# Patient Record
Sex: FEMALE | Race: WHITE | NOT HISPANIC OR LATINO | Employment: FULL TIME | URBAN - METROPOLITAN AREA
[De-identification: names, ages, dates, MRNs, and addresses within clinical notes are randomized per-mention and may not be internally consistent; named-entity substitution may affect disease eponyms.]

---

## 2017-06-14 ENCOUNTER — OFFICE VISIT (OUTPATIENT)
Dept: FAMILY MEDICINE | Facility: CLINIC | Age: 32
End: 2017-06-14
Payer: COMMERCIAL

## 2017-06-14 VITALS
BODY MASS INDEX: 41.15 KG/M2 | TEMPERATURE: 99 F | RESPIRATION RATE: 16 BRPM | DIASTOLIC BLOOD PRESSURE: 60 MMHG | HEIGHT: 64 IN | SYSTOLIC BLOOD PRESSURE: 120 MMHG | WEIGHT: 241 LBS | HEART RATE: 72 BPM

## 2017-06-14 DIAGNOSIS — Z30.41 ORAL CONTRACEPTIVE PILL SURVEILLANCE: ICD-10-CM

## 2017-06-14 DIAGNOSIS — Z00.00 ENCOUNTER FOR GENERAL ADULT MEDICAL EXAMINATION WITHOUT ABNORMAL FINDINGS: Primary | ICD-10-CM

## 2017-06-14 PROCEDURE — 99395 PREV VISIT EST AGE 18-39: CPT | Performed by: PHYSICIAN ASSISTANT

## 2017-06-14 RX ORDER — LEVONORGESTREL/ETHIN.ESTRADIOL 0.1-0.02MG
1 TABLET ORAL DAILY
Qty: 84 TABLET | Refills: 4 | Status: SHIPPED | OUTPATIENT
Start: 2017-06-14 | End: 2018-06-19

## 2017-06-14 ASSESSMENT — PAIN SCALES - GENERAL: PAINLEVEL: NO PAIN (0)

## 2017-06-14 NOTE — MR AVS SNAPSHOT
After Visit Summary   6/14/2017    Eufemia Dow    MRN: [id number]           Patient Information     Date Of Birth          1985        Visit Information        Provider Department      6/14/2017 4:15 PM Iliana Castano PA-C [facility]        Today's Diagnoses     Encounter for general adult medical examination without abnormal findings    -  1    Oral contraceptive pill surveillance          Care Instructions      Preventive Health Recommendations  Female Ages 26 - 39  Yearly exam:   See your health care provider every year in order to    Review health changes.     Discuss preventive care.      Review your medicines if you your doctor has prescribed any.    Until age 30: Get a Pap test every three years (more often if you have had an abnormal result).    After age 30: Talk to your doctor about whether you should have a Pap test every 3 years or have a Pap test with HPV screening every 5 years.   You do not need a Pap test if your uterus was removed (hysterectomy) and you have not had cancer.  You should be tested each year for STDs (sexually transmitted diseases), if you're at risk.   Talk to your provider about how often to have your cholesterol checked.  If you are at risk for diabetes, you should have a diabetes test (fasting glucose).  Shots: Get a flu shot each year. Get a tetanus shot every 10 years.   Nutrition:     Eat at least 5 servings of fruits and vegetables each day.    Eat whole-grain bread, whole-wheat pasta and brown rice instead of white grains and rice.    Talk to your provider about Calcium and Vitamin D.     Lifestyle    Exercise at least 150 minutes a week (30 minutes a day, 5 days of the week). This will help you control your weight and prevent disease.    Limit alcohol to one drink per day.    No smoking.     Wear sunscreen to prevent skin cancer.    See your dentist every six months for an exam and cleaning.            Follow-ups after your visit    "     Who to contact     If you have questions or need follow up information about today's clinic visit or your schedule please contact [facility] directly at [phone].  Normal or non-critical lab and imaging results will be communicated to you by MyChart, letter or phone within 4 business days after the clinic has received the results. If you do not hear from us within 7 days, please contact the clinic through MyChart or phone. If you have a critical or abnormal lab result, we will notify you by phone as soon as possible.  Submit refill requests through [employer] or call your pharmacy and they will forward the refill request to us. Please allow 3 business days for your refill to be completed.          Additional Information About Your Visit        [employer] Information     [employer] lets you send messages to your doctor, view your test results, renew your prescriptions, schedule appointments and more. To sign up, go to [email or web address] . Click on \"Log in\" on the left side of the screen, which will take you to the Welcome page. Then click on \"Sign up Now\" on the right side of the page.     You will be asked to enter the access code listed below, as well as some personal information. Please follow the directions to create your username and password.     Your access code is: [email or web address]  Expires: 2017  4:45 PM     Your access code will  in 90 days. If you need help or a new code, please call your [place] clinic or [phone].        Care EveryWhere ID     This is your Care EveryWhere ID. This could be used by other organizations to access your [place] medical records  [id number]        Your Vitals Were     Pulse Temperature Respirations Height Last Period BMI (Body Mass Index)    72 99  F (37.2  C) (Tympanic) 16 5' 4.3\" (1.633 m) 2017 (Approximate) 40.98 kg/m2       Blood Pressure from Last 3 Encounters:   17 120/60   05/29/15 120/70   14 106/58    Weight from " Last 3 Encounters:   06/14/17 241 lb (109.3 kg)   05/29/15 202 lb (91.6 kg)   05/06/14 203 lb (92.1 kg)              Today, you had the following     No orders found for display         Today's Medication Changes          These changes are accurate as of: 6/14/17  4:45 PM.  If you have any questions, ask your nurse or doctor.               These medicines have changed or have updated prescriptions.        Dose/Directions    levonorgestrel-ethinyl estradiol 0.1-20 MG-MCG per tablet   Commonly known as:  FALMINA   This may have changed:  See the new instructions.   Used for:  Oral contraceptive pill surveillance   Changed by:  Iliana Castano PA-C        Dose:  1 tablet   Take 1 tablet by mouth daily   Quantity:  84 tablet   Refills:  4            Where to get your medicines      These medications were sent to [facility] [place] [facility] [address]   [address] [facility] [address]     Phone:  [phone]     levonorgestrel-ethinyl estradiol 0.1-20 MG-MCG per tablet                Primary Care Provider Office Phone # Fax #    Iliana Castano PA-C [phone]       [address]   [place] [address]        Thank you!     Thank you for choosing [facility]  for your care. Our goal is always to provide you with excellent care. Hearing back from our patients is one way we can continue to improve our services. Please take a few minutes to complete the written survey that you may receive in the mail after your visit with us. Thank you!             Your Updated Medication List - Protect others around you: Learn how to safely use, store and throw away your medicines at www.disposemymeds.org.          This list is accurate as of: 6/14/17  4:45 PM.  Always use your most recent med list.                   Brand Name Dispense Instructions for use    levonorgestrel-ethinyl estradiol 0.1-20 MG-MCG per tablet    FALMINA    84 tablet    Take  1 tablet by mouth daily

## 2017-06-14 NOTE — NURSING NOTE
"Chief Complaint   Patient presents with     Physical       Initial /60  Pulse 72  Temp 99  F (37.2  C) (Tympanic)  Resp 16  Ht 5' 4.3\" (1.633 m)  Wt 241 lb (109.3 kg)  LMP 05/31/2017 (Approximate)  BMI 40.98 kg/m2 Estimated body mass index is 40.98 kg/(m^2) as calculated from the following:    Height as of this encounter: 5' 4.3\" (1.633 m).    Weight as of this encounter: 241 lb (109.3 kg).  Medication Reconciliation: complete   Emily Cannon CMA (AAMA)   "

## 2017-06-14 NOTE — PROGRESS NOTES
SUBJECTIVE:     CC: Eufemia Dow is an 31 year old woman who presents for preventive health visit.     Healthy Habits:    Do you get at least three servings of calcium containing foods daily (dairy, green leafy vegetables, etc.)? yes    Amount of exercise or daily activities, outside of work: 2 day(s) per week    Problems taking medications regularly No    Medication side effects: No    Have you had an eye exam in the past two years? yes    Do you see a dentist twice per year? yes    Do you have sleep apnea, excessive snoring or daytime drowsiness?no            Today's PHQ-2 Score:   PHQ-2 ( 1999 [employer]) 6/14/2017 5/29/2015   Q1: Little interest or pleasure in doing things 0 0   Q2: Feeling down, depressed or hopeless 0 0   PHQ-2 Score 0 0       Abuse: Current or Past(Physical, Sexual or Emotional)- No  Do you feel safe in your environment - Yes    Social History   Substance Use Topics     Smoking status: Never Smoker     Smokeless tobacco: Never Used     Alcohol use Yes      Comment: occ     The patient does not drink >3 drinks per day nor >7 drinks per week.    Recent Labs   Lab Test  05/29/15   1131   CHOL  166   HDL  54   LDL  94   TRIG  90   CHOLHDLRATIO  3.1       Reviewed orders with patient.  Reviewed health maintenance and updated orders accordingly - Yes    Mammo Decision Support:  Mammogram not appropriate for this patient based on age.    Pertinent mammograms are reviewed under the imaging tab.  History of abnormal Pap smear:   Last 3 Pap Results:   PAP (no units)   Date Value   05/29/2015 NIL   03/24/2014 NIL   03/04/2013 NIL       Reviewed and updated as needed this visit by clinical staff  Tobacco  Allergies  Meds         Reviewed and updated as needed this visit by Provider        Past Medical History:   Diagnosis Date     ASCUS favoring benign 1/25/11    + LR HPV     H/O colposcopy with cervical biopsy 2/12/07    WNL with + HR HPV     HSIL (high grade squamous intraepithelial lesion) on Pap  "smear 4/9/09     LSIL (low grade squamous intraepithelial lesion) on Pap smear 1/26/07     Papanicolaou smear of cervix with low grade squamous intraepithelial lesion (LGSIL) 1/30/2007    Positive for HPV 66 - high risk. Colposcopy done.     Tendonitis of wrist, right 11/5/2013     Past Surgical History:   Procedure Laterality Date     HC COLP VAGINA W CERVIX IF PRES W BIOPSY  2/2007     Social History   Substance Use Topics     Smoking status: Never Smoker     Smokeless tobacco: Never Used     Alcohol use Yes      Comment: occ     Family History   Problem Relation Age of Onset     DIABETES Paternal Grandfather      CEREBROVASCULAR DISEASE Maternal Grandmother      CANCER Brother      bone cancer        Allergies   Allergen Reactions     No Known Allergies      Current Outpatient Prescriptions   Medication Sig Dispense Refill     levonorgestrel-ethinyl estradiol (FALMINA) 0.1-20 MG-MCG per tablet Take 1 tablet by mouth daily 84 tablet 4           ROS:  C: NEGATIVE for fever, chills, change in weight  I: NEGATIVE for worrisome rashes, moles or lesions  E: NEGATIVE for vision changes or irritation  ENT: NEGATIVE for ear, mouth and throat problems  R: NEGATIVE for significant cough or SOB  B: NEGATIVE for masses, tenderness or discharge  CV: NEGATIVE for chest pain, palpitations or peripheral edema  GI: NEGATIVE for nausea, abdominal pain, heartburn, or change in bowel habits  : NEGATIVE for unusual urinary or vaginal symptoms. Periods are regular.  M: NEGATIVE for significant arthralgias or myalgia  N: NEGATIVE for weakness, dizziness or paresthesias  E: NEGATIVE for temperature intolerance, skin/hair changes  H: NEGATIVE for bleeding problems  P: NEGATIVE for changes in mood or affect    Problem list, Medication list, Allergies, and Medical/Social/Surgical histories reviewed in [facility] and updated as appropriate.  OBJECTIVE:     /60  Pulse 72  Temp 99  F (37.2  C) (Tympanic)  Resp 16  Ht 5' 4.3\" (1.633 m)  " "Wt 241 lb (109.3 kg)  LMP 05/31/2017 (Approximate)  BMI 40.98 kg/m2  EXAM:  GENERAL: healthy, alert and no distress  EYES: Eyes grossly normal to inspection, PERRL and conjunctivae and sclerae normal  HENT: ear canals and TM's normal, nose and mouth without ulcers or lesions  NECK: no adenopathy, no asymmetry, masses, or scars and thyroid normal to palpation  RESP: lungs clear to auscultation - no rales, rhonchi or wheezes  BREAST: normal without masses, tenderness or nipple discharge and no palpable axillary masses or adenopathy  CV: regular rate and rhythm, normal S1 S2, no S3 or S4, no murmur, click or rub, no peripheral edema and peripheral pulses strong  ABDOMEN: soft, nontender, no hepatosplenomegaly, no masses and bowel sounds normal   (female): deferred  MS: no gross musculoskeletal defects noted, no edema  SKIN: no suspicious lesions or rashes  NEURO: Normal strength and tone, mentation intact and speech normal  PSYCH: mentation appears normal, affect normal/bright    ASSESSMENT/PLAN:         ICD-10-CM    1. Encounter for general adult medical examination without abnormal findings Z00.00    2. Oral contraceptive pill surveillance Z30.41 levonorgestrel-ethinyl estradiol (FALMINA) 0.1-20 MG-MCG per tablet       COUNSELING:   Reviewed preventive health counseling, as reflected in patient instructions       Regular exercise       Healthy diet/nutrition       Vision screening       Contraception       Osteoporosis Prevention/Bone Health         reports that she has never smoked. She has never used smokeless tobacco.    Estimated body mass index is 40.98 kg/(m^2) as calculated from the following:    Height as of this encounter: 5' 4.3\" (1.633 m).    Weight as of this encounter: 241 lb (109.3 kg).   Weight management plan: Discussed healthy diet and exercise guidelines and patient will follow up in 12 months in clinic to re-evaluate.     Refilled OCP x 1 year./    Counseling Resources:  ATP IV " Guidelines  Pooled Cohorts Equation Calculator  Breast Cancer Risk Calculator  FRAX Risk Assessment  ICSI Preventive Guidelines  Dietary Guidelines for Americans, 2010  USDA's MyPlate  ASA Prophylaxis  Lung CA Screening    Iliana Castano PA-C  [facility]    Orders Placed This Encounter     levonorgestrel-ethinyl estradiol (FALMINA) 0.1-20 MG-MCG per tablet       Chart documentation done in part with Dragon Voice recognition Software. Although reviewed after completion, some word and grammatical error may remain.  AVS given to patient upon discharge today.  Electronically signed by Iliana Castano PA-C  [place] 15, 2017  11:09 AM

## 2018-06-19 DIAGNOSIS — Z30.41 ORAL CONTRACEPTIVE PILL SURVEILLANCE: ICD-10-CM

## 2018-06-19 NOTE — TELEPHONE ENCOUNTER
"Requested Prescriptions   Pending Prescriptions Disp Refills     FALMINA 0.1-20 MG-MCG per tablet [Pharmacy Med Name: FALMINA 0.1-20MG-MCG TABS] 84 tablet 3     Sig: TAKE ONE TABLET BY MOUTH EVERY DAY    Contraceptives Protocol Failed    6/19/2018  6:09 AM       Failed - Recent (12 mo) or future (30 days) visit within the authorizing provider's specialty    Patient had office visit in the last 12 months or has a visit in the next 30 days with authorizing provider or within the authorizing provider's specialty.  See \"Patient Info\" tab in inbasket, or \"Choose Columns\" in Meds & Orders section of the refill encounter.           Passed - Patient is not a current smoker if age is 35 or older       Passed - No active pregnancy on record       Passed - No positive pregnancy test in past 12 months        Last Written Prescription Date:  6/14/2017  Last Fill Quantity: 84,  # refills: 4   Last office visit: 6/14/2017 with prescribing provider:  6/14/2017   Future Office Visit:      "

## 2018-06-20 RX ORDER — LEVONORGESTREL AND ETHINYL ESTRADIOL 0.1-0.02MG
KIT ORAL
Qty: 84 TABLET | Refills: 0 | Status: SHIPPED | OUTPATIENT
Start: 2018-06-20 | End: 2018-09-24

## 2018-06-20 NOTE — TELEPHONE ENCOUNTER
OCP Prescription approved per [facility] [place] [place] Refill Protocol for one 3 month refill. Pt needs to schedule an appt.  Notified patient per comment section of RX.  ROEL Jaquez

## 2018-08-03 ENCOUNTER — HEALTH MAINTENANCE LETTER (OUTPATIENT)
Age: 33
End: 2018-08-03

## 2018-09-24 ENCOUNTER — OFFICE VISIT (OUTPATIENT)
Dept: OBGYN | Facility: CLINIC | Age: 33
End: 2018-09-24
Payer: COMMERCIAL

## 2018-09-24 VITALS
OXYGEN SATURATION: 98 % | BODY MASS INDEX: 38.29 KG/M2 | TEMPERATURE: 98.3 F | WEIGHT: 224.3 LBS | HEIGHT: 64 IN | SYSTOLIC BLOOD PRESSURE: 130 MMHG | DIASTOLIC BLOOD PRESSURE: 75 MMHG | HEART RATE: 64 BPM

## 2018-09-24 DIAGNOSIS — E66.812 CLASS 2 OBESITY DUE TO EXCESS CALORIES WITHOUT SERIOUS COMORBIDITY WITH BODY MASS INDEX (BMI) OF 38.0 TO 38.9 IN ADULT: ICD-10-CM

## 2018-09-24 DIAGNOSIS — Z01.419 ENCOUNTER FOR GYNECOLOGICAL EXAMINATION WITHOUT ABNORMAL FINDING: Primary | ICD-10-CM

## 2018-09-24 DIAGNOSIS — Z30.41 ORAL CONTRACEPTIVE PILL SURVEILLANCE: ICD-10-CM

## 2018-09-24 DIAGNOSIS — E66.09 CLASS 2 OBESITY DUE TO EXCESS CALORIES WITHOUT SERIOUS COMORBIDITY WITH BODY MASS INDEX (BMI) OF 38.0 TO 38.9 IN ADULT: ICD-10-CM

## 2018-09-24 PROCEDURE — 99395 PREV VISIT EST AGE 18-39: CPT | Performed by: OBSTETRICS & GYNECOLOGY

## 2018-09-24 PROCEDURE — G0145 SCR C/V CYTO,THINLAYER,RESCR: HCPCS | Performed by: OBSTETRICS & GYNECOLOGY

## 2018-09-24 PROCEDURE — 87624 HPV HI-RISK TYP POOLED RSLT: CPT | Performed by: OBSTETRICS & GYNECOLOGY

## 2018-09-24 RX ORDER — LEVONORGESTREL/ETHIN.ESTRADIOL 0.1-0.02MG
1 TABLET ORAL DAILY
Qty: 84 TABLET | Refills: 3 | Status: SHIPPED | OUTPATIENT
Start: 2018-09-24 | End: 2019-08-27

## 2018-09-24 NOTE — MR AVS SNAPSHOT
After Visit Summary   9/24/2018    Eufemia Dow    MRN: [id number]           Patient Information     Date Of Birth          1985        Visit Information        Provider Department      9/24/2018 4:00 PM Augustina Bowers MD [facility]        Today's Diagnoses     Encounter for gynecological examination without abnormal finding    -  1    Oral contraceptive pill surveillance        Class 2 obesity due to excess calories without serious comorbidity with body mass index (BMI) of 38.0 to 38.9 in adult           Follow-ups after your visit        Follow-up notes from your care team     Return in about 1 year (around 9/24/2019).      Who to contact     If you have questions or need follow up information about today's clinic visit or your schedule please contact [facility] directly at [phone].  Normal or non-critical lab and imaging results will be communicated to you by MyChart, letter or phone within 4 business days after the clinic has received the results. If you do not hear from us within 7 days, please contact the clinic through MyChart or phone. If you have a critical or abnormal lab result, we will notify you by phone as soon as possible.  Submit refill requests through [employer] or call your pharmacy and they will forward the refill request to us. Please allow 3 business days for your refill to be completed.          Additional Information About Your Visit        MyChart Information     [employer] gives you secure access to your electronic health record. If you see a primary care provider, you can also send messages to your care team and make appointments. If you have questions, please call your primary care clinic.  If you do not have a primary care provider, please call [phone] and they will assist you.        Care EveryWhere ID     This is your Care EveryWhere ID. This could be used by other organizations to access your [facility]  "records  [id number]        Your Vitals Were     Pulse Temperature Height Last Period Pulse Oximetry BMI (Body Mass Index)    64 98.3  F (36.8  C) (Oral) 5' 4.41\" (1.636 m) 08/20/2018 98% 38.01 kg/m2       Blood Pressure from Last 3 Encounters:   09/24/18 130/75   06/14/17 120/60   05/29/15 120/70    Weight from Last 3 Encounters:   09/24/18 224 lb 4.8 oz (101.7 kg)   06/14/17 241 lb (109.3 kg)   05/29/15 202 lb (91.6 kg)              We Performed the Following     Pap imaged thin layer screen with HPV - recommended age 30 - 65 years (select HPV order below)          Today's Medication Changes          These changes are accurate as of 9/24/18 11:59 PM.  If you have any questions, ask your nurse or doctor.               These medicines have changed or have updated prescriptions.        Dose/Directions    levonorgestrel-ethinyl estradiol 0.1-20 MG-MCG per tablet   Commonly known as:  FALMINA   This may have changed:  See the new instructions.   Used for:  Oral contraceptive pill surveillance   Changed by:  Augustina Bowers MD        Dose:  1 tablet   Take 1 tablet by mouth daily   Quantity:  84 tablet   Refills:  3            Where to get your medicines      These medications were sent to [facility], [address]   [address] [place] [address] [address] [facility] [address]     Phone:  [phone]     levonorgestrel-ethinyl estradiol 0.1-20 MG-MCG per tablet                Primary Care Provider Office Phone # Fax #    Iliana Castano PA-C [phone]       [address]   [place] [address]        Equal Access to Services     [facility] CenterJOSUÉ AH: Hadii pro Singh, wabrandida luqadaha, qaybta kaalmada adeegyada, brandon ellis. So [facility] [phone].    ATENCIÓN: Si habla español, tiene a valdes disposición servicios gratuitos de asistencia lingüística. Llame al [phone].    We comply with applicable federal civil rights laws and [place] laws. We do " not discriminate on the basis of race, color, national origin, age, disability, sex, sexual orientation, or gender identity.            Thank you!     Thank you for choosing [facility]  for your care. Our goal is always to provide you with excellent care. Hearing back from our patients is one way we can continue to improve our services. Please take a few minutes to complete the written survey that you may receive in the mail after your visit with us. Thank you!             Your Updated Medication List - Protect others around you: Learn how to safely use, store and throw away your medicines at www.disposemymeds.org.          This list is accurate as of 9/24/18 11:59 PM.  Always use your most recent med list.                   Brand Name Dispense Instructions for use Diagnosis    levonorgestrel-ethinyl estradiol 0.1-20 MG-MCG per tablet    FALMINA    84 tablet    Take 1 tablet by mouth daily    Oral contraceptive pill surveillance

## 2018-09-24 NOTE — PROGRESS NOTES
Gyn annual Eufemia is a 32 year old  female who presents for annual exam.     Besides routine health maintenance,  she would like to discuss getting refills on her ocp's.    HPI:  The patient's PCP is  Iliana Castano PA-C.  Patient has no complaints or concerns.    GYNECOLOGIC HISTORY:    Patient's last menstrual period was 2018.  Her current contraception method is: oral contraceptives.  She  reports that she has never smoked. She has never used smokeless tobacco.    Patient is sexually active.  STD testing offered?  Declined    HEALTH MAINTENANCE:  Cholesterol:   Cholesterol   Date Value Ref Range Status   2015 166 <200 mg/dL Final     Comment:     LDL Cholesterol is the primary guide to therapy.   The NCEP recommends further evaluation of: patients with cholesterol greater   than 200 mg/dL if additional risk factors are present, cholesterol greater   than   240 mg/dL, triglycerides greater than 150 mg/dL, or HDL less than 40 mg/dL.        Last Mammo: N/A   Mammo: N/A  Pap:   Lab Results   Component Value Date    PAP NIL 2015    PAP NIL 2014    PAP NIL 2013      Colonoscopy:  N/A  Dexa:  N/A    HISTORY:  Obstetric History       T0      L0     SAB0   TAB0   Ectopic0   Multiple0   Live Births0           Patient Active Problem List   Diagnosis     Papanicolaou smear of cervix with low grade squamous intraepithelial lesion (LGSIL)     HSIL (high grade squamous intraepithelial lesion) on Pap smear     CARDIOVASCULAR SCREENING; LDL GOAL LESS THAN 160     Obesity     Oral contraceptive pill surveillance     Past Surgical History:   Procedure Laterality Date     HC COLP VAGINA W CERVIX IF PRES W BIOPSY  2007      Social History   Substance Use Topics     Smoking status: Never Smoker     Smokeless tobacco: Never Used     Alcohol use Yes      Comment: occ      Problem (# of Occurrences) Relation (Name,Age of Onset)    Cancer (1) Brother: bone cancer    Cerebrovascular  "Disease (1) Maternal Grandmother (Farheen)    Diabetes (1) Paternal Grandfather (Jamari)            Current Outpatient Prescriptions   Medication Sig     levonorgestrel-ethinyl estradiol (FALMINA) 0.1-20 MG-MCG per tablet Take 1 tablet by mouth daily     No current facility-administered medications for this visit.      Allergies   Allergen Reactions     No Known Allergies        Past medical, surgical, social and family histories were reviewed and updated in EPIC.    ROS:   12 point review of systems negative other than symptoms noted below.    EXAM:  /75  Pulse 64  Temp 98.3  F (36.8  C) (Oral)  Ht 5' 4.41\" (1.636 m)  Wt 224 lb 4.8 oz (101.7 kg)  LMP 08/20/2018  SpO2 98%  BMI 38.01 kg/m2   BMI: Body mass index is 38.01 kg/(m^2).    PHYSICAL EXAM:  Constitutional:  Appearance: Well nourished, well developed, alert, in no acute distress  Neck:  Lymph Nodes:  No lymphadenopathy present    Thyroid:  Gland size normal, nontender, no nodules or masses present  on palpation  Chest:  Respiratory Effort:  Breathing unlabored  Cardiovascular:    Heart: Auscultation:  Regular rate, normal rhythm, no murmurs present  Breasts: Palpation of Breasts and Axillae:  No masses present on palpation, no breast tenderness., Axillary Lymph Nodes:  No lymphadenopathy present. and No nodularity, asymmetry or nipple discharge bilaterally.  Gastrointestinal:   Abdominal Examination:  Abdomen nontender to palpation, tone normal without rigidity or guarding, no masses present, umbilicus without lesions   Liver and Spleen:  No hepatomegaly present, liver nontender to palpation    Hernias:  No hernias present  Lymphatic: Lymph Nodes:  No other lymphadenopathy present  Skin:  General Inspection:  No rashes present, no lesions present, no areas of  discoloration    Genitalia and Groin:  No rashes present, no lesions present, no areas of  discoloration, no masses present  Neurologic/Psychiatric:    Mental Status:  Oriented X3     Pelvic " Exam:  External Genitalia:     Normal appearance for age, no discharge present, no tenderness present, no inflammatory lesions present, color normal  Vagina:     Normal vaginal vault without central or paravaginal defects, no discharge present, no inflammatory lesions present, no masses present  Bladder:     Nontender to palpation  Urethra:   Urethral Body:  Urethra palpation normal, urethra structural support normal   Urethral Meatus:  No erythema or lesions present  Cervix:     Appearance healthy, no lesions present, nontender to palpation, no bleeding present  Uterus:     Uterus: firm, normal sized and nontender, anteverted in position.   Adnexa:     No adnexal tenderness present, no adnexal masses present  Perineum:     Perineum within normal limits, no evidence of trauma, no rashes or skin lesions present  Anus:     Anus within normal limits, no hemorrhoids present  Inguinal Lymph Nodes:     No lymphadenopathy present  Pubic Hair:     Normal pubic hair distribution for age  Genitalia and Groin:     No rashes present, no lesions present, no areas of discoloration, no masses present      COUNSELING:   Reviewed preventive health counseling, as reflected in patient instructions       Regular exercise       Healthy diet/nutrition       Contraception       Safe sex practices/STD prevention    BMI: Body mass index is 38.01 kg/(m^2).      ASSESSMENT:  32 year old female with satisfactory annual exam.    ICD-10-CM    1. Encounter for gynecological examination without abnormal finding Z01.419 Pap imaged thin layer screen with HPV - recommended age 30 - 65 years (select HPV order below)   2. Oral contraceptive pill surveillance Z30.41 levonorgestrel-ethinyl estradiol (FALMINA) 0.1-20 MG-MCG per tablet   3. Class 2 obesity due to excess calories without serious comorbidity with body mass index (BMI) of 38.0 to 38.9 in adult E66.09     Z68.38        PLAN:    Pap today. Refilled ocp's x 1 year. RTC in 1 year and  isreal.    Augustina Bowers MD

## 2018-09-25 ASSESSMENT — PATIENT HEALTH QUESTIONNAIRE - PHQ9: SUM OF ALL RESPONSES TO PHQ QUESTIONS 1-9: 0

## 2018-09-27 LAB
COPATH REPORT: NORMAL
PAP: NORMAL

## 2018-09-28 LAB
FINAL DIAGNOSIS: NORMAL
HPV HR 12 DNA CVX QL NAA+PROBE: NEGATIVE
HPV16 DNA SPEC QL NAA+PROBE: NEGATIVE
HPV18 DNA SPEC QL NAA+PROBE: NEGATIVE
SPECIMEN DESCRIPTION: NORMAL
SPECIMEN SOURCE CVX/VAG CYTO: NORMAL

## 2019-08-15 DIAGNOSIS — Z30.41 ORAL CONTRACEPTIVE PILL SURVEILLANCE: ICD-10-CM

## 2019-08-15 RX ORDER — LEVONORGESTREL/ETHIN.ESTRADIOL 0.1-0.02MG
1 TABLET ORAL DAILY
Qty: 84 TABLET | Refills: 3 | Status: CANCELLED | OUTPATIENT
Start: 2019-08-15

## 2019-08-15 NOTE — TELEPHONE ENCOUNTER
FALMINA OCP  Last Written Prescription Date:  9/24/19  Last Fill Quantity: 84,  # refills: 3   Last office visit: 6/14/2017 with prescribing provider- Iliana Pruitt, She no longer works here.  She saw a Dr. Augustina Francis 9/24/18    Future Office Visit:  NONE  Routing refill request to provider for review/approval because:  Pt has NOT established care with another provider . Will forward to Dr. Francis.  ROEL Jqauez

## 2019-08-27 ENCOUNTER — OFFICE VISIT (OUTPATIENT)
Dept: OBGYN | Facility: CLINIC | Age: 34
End: 2019-08-27
Payer: COMMERCIAL

## 2019-08-27 VITALS
WEIGHT: 222.8 LBS | DIASTOLIC BLOOD PRESSURE: 84 MMHG | BODY MASS INDEX: 37.12 KG/M2 | SYSTOLIC BLOOD PRESSURE: 120 MMHG | HEART RATE: 66 BPM | HEIGHT: 65 IN

## 2019-08-27 DIAGNOSIS — Z30.41 ORAL CONTRACEPTIVE PILL SURVEILLANCE: ICD-10-CM

## 2019-08-27 DIAGNOSIS — Z00.00 ANNUAL PHYSICAL EXAM: Primary | ICD-10-CM

## 2019-08-27 PROCEDURE — 99395 PREV VISIT EST AGE 18-39: CPT | Performed by: OBSTETRICS & GYNECOLOGY

## 2019-08-27 RX ORDER — LEVONORGESTREL/ETHIN.ESTRADIOL 0.1-0.02MG
1 TABLET ORAL DAILY
Qty: 84 TABLET | Refills: 11 | Status: SHIPPED | OUTPATIENT
Start: 2019-08-27 | End: 2020-11-01

## 2019-08-27 ASSESSMENT — MIFFLIN-ST. JEOR: SCORE: 1715.87

## 2019-08-27 NOTE — NURSING NOTE
"Chief Complaint   Patient presents with     Physical       Initial /84 (BP Location: Right arm, Patient Position: Chair, Cuff Size: Adult Regular)   Pulse 66   Ht 1.642 m (5' 4.65\")   Wt 101.1 kg (222 lb 12.8 oz)   LMP 2019   BMI 37.48 kg/m   Estimated body mass index is 37.48 kg/m  as calculated from the following:    Height as of this encounter: 1.642 m (5' 4.65\").    Weight as of this encounter: 101.1 kg (222 lb 12.8 oz).  BP completed using cuff size: regular        The following HM Due: NONE      The following patient reported/Care Every where data was sent to:  P ABSTRACT QUALITY INITIATIVES [[id number]]       n/a       Grace Vaughan CMA  2019    "

## 2019-08-27 NOTE — PROGRESS NOTES
Clinic Note    CC:    Chief Complaint   Patient presents with     Physical      HPI:  Eufemia Dow is a 33 year old  woman who presents for annual exam and OCP refill. No specific concerns. Sexually active with one long term male partner, no dyspareunia, no partner abuse. No breast nor vaginal issues, no trouble with voiding nor BMs, no incontinence. Doing well with OCPs, regular interval bleeding.     Ob Hx:     Gyn Hx: Patient's last menstrual period was 08/18/2019.  Menses are regular, on OCPs   Last pap NIL, HPV- (9/18)   Using OCPs for birth control  PMH:   Past Medical History:   Diagnosis Date     ASCUS favoring benign 1/25/11    + LR HPV     H/O colposcopy with cervical biopsy 2/12/07    WNL with + HR HPV     HSIL (high grade squamous intraepithelial lesion) on Pap smear 4/9/09     LSIL (low grade squamous intraepithelial lesion) on Pap smear 1/26/07     Papanicolaou smear of cervix with low grade squamous intraepithelial lesion (LGSIL) 1/30/2007    Positive for HPV 66 - high risk. Colposcopy done.     Tendonitis of wrist, right 11/5/2013     SurgHx:   Past Surgical History:   Procedure Laterality Date     HC COLP VAGINA W CERVIX IF PRES W BIOPSY  2/2007     FamHx:   Family History   Problem Relation Age of Onset     Diabetes Paternal Grandfather      Cerebrovascular Disease Maternal Grandmother      Cancer Brother         bone cancer     SocHx:   Social History     Socioeconomic History     Marital status: Single     Spouse name: None     Number of children: None     Years of education: None     Highest education level: None   Occupational History     None   Social Needs     Financial resource strain: None     Food insecurity:     Worry: None     Inability: None     Transportation needs:     Medical: None     Non-medical: None   Tobacco Use     Smoking status: Never Smoker     Smokeless tobacco: Never Used   Substance and Sexual Activity     Alcohol use: Yes     Comment: occ     Drug use: No  "    Sexual activity: Yes     Partners: Male     Birth control/protection: Pill   Lifestyle     Physical activity:     Days per week: None     Minutes per session: None     Stress: None   Relationships     Social connections:     Talks on phone: None     Gets together: None     Attends Restorationist service: None     Active member of club or organization: None     Attends meetings of clubs or organizations: None     Relationship status: None     Intimate partner violence:     Fear of current or ex partner: None     Emotionally abused: None     Physically abused: None     Forced sexual activity: None   Other Topics Concern     Parent/sibling w/ CABG, MI or angioplasty before 65F 55M? Not Asked   Social History Narrative     None     Allergies:   No known allergies  Current Medications:   Current Outpatient Medications   Medication Sig Dispense Refill     levonorgestrel-ethinyl estradiol (FALMINA) 0.1-20 MG-MCG tablet Take 1 tablet by mouth daily 84 tablet 11       ROS: 10 point ROS negative other than as noted in the HPI    EXAM:  Vitals:    08/27/19 1006   BP: 120/84   BP Location: Right arm   Patient Position: Chair   Cuff Size: Adult Regular   Pulse: 66   Weight: 101.1 kg (222 lb 12.8 oz)   Height: 1.642 m (5' 4.65\")    Body mass index is 37.48 kg/m .    Gen: Alert, oriented, appropriately interactive, NAD  CV: RRR, 2+ peripheral pulses  Resp: CTAB, good effort without distress   Breasts: no axillary adenopathy, no dominant masses, no skin changes, no nipple discharge, nontender  Abdomen: soft, obese, non tender, non distended, no masses, no hernias. No inguinal lymphadenopathy.   Perineum: no lesions; normal appearing external genitalia, bartholins glands, urethra, skenes glands  Vagina: no masses or lesions or discharge, normally rugated.  Cervix: no masses or lesions or discharge   Bimanual exam:   Nontender pelvic floor muscles  Urethra: nontender   Bladder: nontender and without massess, well supported   Uterus: " midline, anteverted, small, mobile  no masses, non-tender  Adnexa: no masses or tenderness appreciated   No cervical motion tenderness  MSK: normal gait, symmetric movements UE & LE  Lower extremities: non-tender, no edema    Labs & Imaging:  No results found for this or any previous visit (from the past 24 hour(s)).    Lab Results   Component Value Date    PAP NIL 09/24/2018    PAP NIL 05/29/2015    PAP NIL 03/24/2014       ASSESSMENT/PLAN: Eufemia Dow is a 33 year old  woman who presents for annual exam and OCP refill. No specific concerns.     1. Annual physical exam  Normal PE today including breast and pelvic  No safety concerns  Return in one year    2. Oral contraceptive pill surveillance  levonorgestrel-ethinyl estradiol (FALMINA) 0.1-20 MG-MCG tablet; Take 1 tablet by mouth daily  Dispense: 84 tablet; Refill: 11    Jomar Wynne MD MD  8/27/2019 10:23 AM

## 2019-11-24 ENCOUNTER — HOSPITAL ENCOUNTER (EMERGENCY)
Facility: CLINIC | Age: 34
Discharge: HOME | End: 2019-11-24
Attending: EMERGENCY MEDICINE | Admitting: EMERGENCY MEDICINE
Payer: COMMERCIAL

## 2019-11-24 VITALS
WEIGHT: 220 LBS | SYSTOLIC BLOOD PRESSURE: 154 MMHG | TEMPERATURE: 98.7 F | OXYGEN SATURATION: 99 % | HEART RATE: 81 BPM | BODY MASS INDEX: 37.01 KG/M2 | DIASTOLIC BLOOD PRESSURE: 87 MMHG | RESPIRATION RATE: 18 BRPM

## 2019-11-24 DIAGNOSIS — S61.452A DOG BITE OF LEFT HAND, INITIAL ENCOUNTER: ICD-10-CM
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
## 2019-11-24 DIAGNOSIS — S61.012A: ICD-10-CM

## 2019-11-24 PROCEDURE — 99283 EMERGENCY DEPT VISIT LOW MDM: CPT | Mod: 25 | Performed by: EMERGENCY MEDICINE

## 2019-11-24 PROCEDURE — 12002 RPR S/N/AX/GEN/TRNK2.6-7.5CM: CPT | Mod: Z6 | Performed by: EMERGENCY MEDICINE

## 2019-11-24 PROCEDURE — 25000128 H RX IP 250 OP 636: Performed by: EMERGENCY MEDICINE

## 2019-11-24 PROCEDURE — 90471 IMMUNIZATION ADMIN: CPT | Performed by: EMERGENCY MEDICINE

## 2019-11-24 PROCEDURE — 90715 TDAP VACCINE 7 YRS/> IM: CPT | Performed by: EMERGENCY MEDICINE

## 2019-11-24 PROCEDURE — 12002 RPR S/N/AX/GEN/TRNK2.6-7.5CM: CPT | Performed by: EMERGENCY MEDICINE

## 2019-11-24 RX ADMIN — CLOSTRIDIUM TETANI TOXOID ANTIGEN (FORMALDEHYDE INACTIVATED), CORYNEBACTERIUM DIPHTHERIAE TOXOID ANTIGEN (FORMALDEHYDE INACTIVATED), BORDETELLA PERTUSSIS TOXOID ANTIGEN (GLUTARALDEHYDE INACTIVATED), BORDETELLA PERTUSSIS FILAMENTOUS HEMAGGLUTININ ANTIGEN (FORMALDEHYDE INACTIVATED), BORDETELLA PERTUSSIS PERTACTIN ANTIGEN, AND BORDETELLA PERTUSSIS FIMBRIAE 2/3 ANTIGEN 0.5 ML: 5; 2; 2.5; 5; 3; 5 INJECTION, SUSPENSION INTRAMUSCULAR at 19:08

## 2019-11-24 NOTE — ED AVS SNAPSHOT
[facility] Emergency Department  [address] [place] [address]  Phone:  [phone]  Fax:  [phone]                                    Eufemia Dow   MRN: [id number]    Department:  [facility] Emergency Department   Date of Visit:  11/24/2019           After Visit Summary Signature Page    I have received my discharge instructions, and my questions have been answered. I have discussed any challenges I see with this plan with the nurse or doctor.    ..........................................................................................................................................  Patient/Patient Representative Signature      ..........................................................................................................................................  Patient Representative Print Name and Relationship to Patient    ..................................................               ................................................  Date                                   Time    ..........................................................................................................................................  Reviewed by Signature/Title    ...................................................              ..............................................  Date                                               Time          22EPIC Rev 08/18

## 2019-11-24 NOTE — LETTER
November 24, 2019      To Whom It May Concern:      Eufemia Dow was seen in our Emergency Department today, 11/24/19.  I expect her condition to improve over the next 7 days.  She may return to work but must rest the left thumb and keep it clean and dry.     Sincerely,        Ferdinand Frank MD

## 2019-11-25 NOTE — ED TRIAGE NOTES
Patient was bitten by accident by her boyfriend's dog approx 1 hours ago.  Large laceration to the inner aspect of the left thumb and two puncture wounds to the left knuckles. Dog is not vaccinated. The patient's dog and the boyfriend's dog were fighting and patient was trying to get them to break apart from each other.

## 2019-11-25 NOTE — ED NOTES
Doberman/weimarian mix bit pt today when pt was trying to pull the dog from another he was fighting with.  Left thumb laceration.  Good distal CMS.  PD/dog control notified.

## 2019-11-25 NOTE — ED NOTES
TDAP Vaccine (Adacel)     Date Status Dose VIS Date Route Site  Lot# Given By Verified By   1/25/2011 Given 0.5 11/18/2008, Given Today IM RD [employer] [id number] Wilma Durán --   Exp. Date NDC # Product Time Location External Comment   4/13/2013   --  --    Updated by: Wilma Durán on 1/25/2011

## 2019-11-25 NOTE — DISCHARGE INSTRUCTIONS
Keep the wound clean and dry.  Apply antibiotic ointment every couple of days along with a new bandage.  Follow-up for suture removal in 8 days.  Return to the ER if new or worsening symptoms.

## 2019-11-25 NOTE — ED PROVIDER NOTES
History     Chief Complaint   Patient presents with     Dog Bite     HPI  Eufemia Dow is a 34 year old female who presents to the emergency department complaining of a left thumb and dorsal hand laceration secondary to a dog bite.  HER-2 dogs were fighting and she was trying to break them up when 1 of them actually bit her hand.  The dog appeared to be regretful of this.  It is not an aggressive drug in general.  No history of abnormal behavior concerning for rabies.  The dog has been vaccinated for rabies.  She presented straight here.  She has full range of motion of her fingers.  There is minimal bleeding at this time.  She has a large laceration along her left lateral thumb.  She has one other smaller laceration on the dorsal aspect of her left hand.  No other injuries.  Her tetanus vaccination was last updated 8 years ago.    Allergies:  Allergies   Allergen Reactions     No Known Allergies        Problem List:    Patient Active Problem List    Diagnosis Date Noted     Oral contraceptive pill surveillance 06/14/2017     Priority: Medium     Obesity 03/25/2014     Priority: Medium     CARDIOVASCULAR SCREENING; LDL GOAL LESS THAN 160 10/31/2010     Priority: Medium     HSIL (high grade squamous intraepithelial lesion) on Pap smear 06/01/2009     Priority: Medium     1/26/07 LSIL  2/12/07 colpo/ECC (neg) with + HR HPV  4/9/09 pap HSIL- no f/u. Certified letter sent.  Having problems contacting patient - 2 certified letters sent and were returned. If she presents to the clinic needs to scheduled for colpo.  1/25/11 pap ASCUS + LR HPV (6) recommend-- colpo.  1/25/12- certified reminder letter sent. Recheck 1 month.  2/27/12 pap NIL. Plan-- pap in 1 year (Due 2/27/13)   3/4/13 pap NIL.   3/24/14 NIL pap, neg HR HPV. Plan-- repeat pap/HPV in 1 year (due 3/24/15)  9/24/18 NIL pap, neg HR HPV. Plan: cotest in 3 years.         Papanicolaou smear of cervix with low grade squamous intraepithelial lesion (LGSIL)  01/30/2007     Priority: Medium     Positive for HPV 66 - high risk. Colposcopy done.          Past Medical History:    Past Medical History:   Diagnosis Date     ASCUS favoring benign 1/25/11     H/O colposcopy with cervical biopsy 2/12/07     HSIL (high grade squamous intraepithelial lesion) on Pap smear 4/9/09     LSIL (low grade squamous intraepithelial lesion) on Pap smear 1/26/07     Papanicolaou smear of cervix with low grade squamous intraepithelial lesion (LGSIL) 1/30/2007     Tendonitis of wrist, right 11/5/2013       Past Surgical History:    Past Surgical History:   Procedure Laterality Date     HC COLP VAGINA W CERVIX IF PRES W BIOPSY  2/2007       Family History:    Family History   Problem Relation Age of Onset     Diabetes Paternal Grandfather      Cerebrovascular Disease Maternal Grandmother      Cancer Brother         bone cancer       Social History:  Marital Status:  Single [1]  Social History     Tobacco Use     Smoking status: Never Smoker     Smokeless tobacco: Never Used   Substance Use Topics     Alcohol use: Yes     Comment: occ     Drug use: No        Medications:    levonorgestrel-ethinyl estradiol (FALMINA) 0.1-20 MG-MCG tablet          Review of Systems   All other systems reviewed and are negative.      Physical Exam   BP: (!) 154/87  Pulse: 81  Temp: 98.7  F (37.1  C)  Resp: 18  Weight: 99.8 kg (220 lb)  SpO2: 99 %      Physical Exam  Vitals signs and nursing note reviewed.   Constitutional:       General: She is not in acute distress.     Appearance: She is well-developed. She is not diaphoretic.   HENT:      Head: Normocephalic and atraumatic.   Eyes:      General: No scleral icterus.  Neck:      Musculoskeletal: Normal range of motion and neck supple.   Musculoskeletal: Normal range of motion.         General: Swelling and tenderness present. No deformity or signs of injury.      Right lower leg: No edema.   Skin:     General: Skin is warm and dry.      Coloration: Skin is not  jaundiced or pale.      Findings: No bruising, erythema or rash.      Comments: 4 cm ragged edged linear laceration over the radial aspect of the left thumb.  There is a 5 mm laceration over the dorsal aspect of the left hand.  There is no damage to the deep structures.  There is no tendon lacerations.  There is no deep puncture wound.   Neurological:      General: No focal deficit present.      Mental Status: She is alert and oriented to person, place, and time.   Psychiatric:         Mood and Affect: Mood normal.         Thought Content: Thought content normal.         ED Course        Procedures          [facility] Procedure Note        Laceration Repair:    Performed by: Ferdinand Frank MD  Authorized by: Ferdinand Frank MD  Consent given by: Patient who states understanding of the procedure being performed after discussing the risks, benefits and alternatives.    Preparation: Patient was prepped and draped in usual sterile fashion.  Irrigation solution: saline    Body area:left thumb and dorsum of the left hand  Laceration length: 4cm and 5 mm  Contamination: The wound is not contaminated.  Foreign bodies:none  Tendon involvement: none  Anesthesia: Local  Local anesthetic: Bupivacaine 0.5%  Anesthetic total: 10ml    Debridement: small amount  Skin closure: Closed with 9 x 4.0 Ethilon and 2 4-0 ethilon  Technique: interrupted  Approximation: close  Approximation difficulty: simple    Patient tolerance: Patient tolerated the procedure well with no immediate complications.\                 No results found for this or any previous visit (from the past 24 hour(s)).    Medications   Tdap (tetanus-diphtheria-acell pertussis) (ADACEL) injection 0.5 mL (has no administration in time range)       Assessments & Plan (with Medical Decision Making)  34-year-old female with laceration after dog bite.  The wound was cleansed extensively prior to repair.  There was no deep puncture wounds.  There was a small amount  of debridement was performed.  The wound was closed antibiotic ointment and nonstick bandage was placed.  The dog is vaccinated for rabies.  The patient was updated with Tdap.  The diagnosis, treatment options, risks and follow-up discussed with a competent patient who agrees with the plan.     I have reviewed the nursing notes.    I have reviewed the findings, diagnosis, plan and need for follow up with the patient.      New Prescriptions    No medications on file       Final diagnoses:   Laceration of skin of thumb, left, initial encounter   Dog bite of left hand, initial encounter       11/24/2019   [facility] EMERGENCY DEPARTMENT     Ferdinand Frank MD  11/24/19 1913

## 2019-12-02 ENCOUNTER — ALLIED HEALTH/NURSE VISIT (OUTPATIENT)
Dept: FAMILY MEDICINE | Facility: CLINIC | Age: 34
End: 2019-12-02
Payer: COMMERCIAL

## 2019-12-02 DIAGNOSIS — Z48.02 VISIT FOR SUTURE REMOVAL: Primary | ICD-10-CM

## 2019-12-02 PROCEDURE — 99207 ZZC NO CHARGE NURSE ONLY: CPT

## 2019-12-02 NOTE — PROGRESS NOTES
Eufemia Dow presents to the clinic today for removal of sutures.  The patient has had the sutures in place for 8 days.  There has been no history of infection or drainage.  11 sutures are seen located on the left thumb and hand.  The wound is healing well with no signs of infection.  Tetanus status is up to date.   All sutures were easily removed today.  Routine wound care discussed.  The patient will follow up as needed.  Anahi Diallo RN BSN

## 2020-11-01 ENCOUNTER — TELEPHONE (OUTPATIENT)
Dept: FAMILY MEDICINE | Facility: CLINIC | Age: 35
End: 2020-11-01

## 2020-11-01 DIAGNOSIS — Z30.41 ORAL CONTRACEPTIVE PILL SURVEILLANCE: ICD-10-CM

## 2020-11-01 RX ORDER — LEVONORGESTREL/ETHIN.ESTRADIOL 0.1-0.02MG
1 TABLET ORAL DAILY
Qty: 84 TABLET | Refills: 11 | Status: SHIPPED | OUTPATIENT
Start: 2020-11-01 | End: 2023-06-23

## 2021-01-09 ENCOUNTER — HEALTH MAINTENANCE LETTER (OUTPATIENT)
Age: 36
End: 2021-01-09

## 2021-10-23 ENCOUNTER — HEALTH MAINTENANCE LETTER (OUTPATIENT)
Age: 36
End: 2021-10-23

## 2022-01-18 DIAGNOSIS — Z30.41 ORAL CONTRACEPTIVE PILL SURVEILLANCE: ICD-10-CM

## 2022-01-18 RX ORDER — LEVONORGESTREL/ETHIN.ESTRADIOL 0.1-0.02MG
1 TABLET ORAL DAILY
Qty: 84 TABLET | Refills: 11 | Status: CANCELLED | OUTPATIENT
Start: 2022-01-18

## 2022-01-20 RX ORDER — LEVONORGESTREL/ETHIN.ESTRADIOL 0.1-0.02MG
1 TABLET ORAL DAILY
Qty: 84 TABLET | Refills: 11 | OUTPATIENT
Start: 2022-01-20

## 2022-01-31 ENCOUNTER — VIRTUAL VISIT (OUTPATIENT)
Dept: OBGYN | Facility: CLINIC | Age: 37
End: 2022-01-31
Payer: COMMERCIAL

## 2022-01-31 DIAGNOSIS — Z30.40 ENCOUNTER FOR SURVEILLANCE OF CONTRACEPTIVES, UNSPECIFIED CONTRACEPTIVE: Primary | ICD-10-CM

## 2022-01-31 PROCEDURE — 99212 OFFICE O/P EST SF 10 MIN: CPT | Mod: 95 | Performed by: OBSTETRICS & GYNECOLOGY

## 2022-01-31 RX ORDER — LEVONORGESTREL/ETHIN.ESTRADIOL 0.1-0.02MG
1 TABLET ORAL DAILY
Qty: 84 TABLET | Refills: 0 | Status: SHIPPED | OUTPATIENT
Start: 2022-01-31 | End: 2022-07-05

## 2022-01-31 NOTE — PROGRESS NOTES
Spoke with Eufemia via phone visit today  Total time of charting and talking with Eufemia was 15 min  Call lasted 5 min of which entire time discussing her birth control option.  Eufemia is on Famila and feels that is working well for her.  Periods are light, no excessive cramps.    Eufemia notes she has history of abnl pap, HGSIL, however last two paps were normal.  No record in our system or CareEverywhere of abnl paps  She knows she is due for repeat pap at this point.    Pt traveling to [place] and needing refill for now  PMHx negative for contraindications    A/P  Will refill Falmina for one package, 3 months.  Pt knows to schedule annual exam within that timeframe.

## 2022-01-31 NOTE — PROGRESS NOTES
Eufemia Dow is a 36 year old female who is being evaluated via a billable telephone visit.      What phone number would you like to be contacted at? [phone]  How would you like to obtain your AVS? Rachelt

## 2022-02-12 ENCOUNTER — HEALTH MAINTENANCE LETTER (OUTPATIENT)
Age: 37
End: 2022-02-12

## 2022-06-20 ENCOUNTER — OFFICE VISIT (OUTPATIENT)
Dept: OBGYN | Facility: CLINIC | Age: 37
End: 2022-06-20
Payer: COMMERCIAL

## 2022-06-20 VITALS
HEIGHT: 65 IN | SYSTOLIC BLOOD PRESSURE: 120 MMHG | WEIGHT: 237.1 LBS | BODY MASS INDEX: 39.5 KG/M2 | HEART RATE: 78 BPM | DIASTOLIC BLOOD PRESSURE: 80 MMHG

## 2022-06-20 DIAGNOSIS — Z11.4 SCREENING FOR HIV (HUMAN IMMUNODEFICIENCY VIRUS): ICD-10-CM

## 2022-06-20 DIAGNOSIS — Z12.4 CERVICAL CANCER SCREENING: ICD-10-CM

## 2022-06-20 DIAGNOSIS — Z11.59 NEED FOR HEPATITIS C SCREENING TEST: ICD-10-CM

## 2022-06-20 PROCEDURE — G0145 SCR C/V CYTO,THINLAYER,RESCR: HCPCS | Performed by: OBSTETRICS & GYNECOLOGY

## 2022-06-20 PROCEDURE — 99395 PREV VISIT EST AGE 18-39: CPT | Performed by: OBSTETRICS & GYNECOLOGY

## 2022-06-20 PROCEDURE — 87624 HPV HI-RISK TYP POOLED RSLT: CPT | Performed by: OBSTETRICS & GYNECOLOGY

## 2022-06-20 NOTE — PROGRESS NOTES
Eufemia is a 36 year old  female who presents for annual exam.     Besides routine health maintenance, she has no other health concerns today .    HPI:  The patient's PCP is [facility].       GYNECOLOGIC HISTORY:    Patient's last menstrual period was 2022.    Regular menses? yes  Menses every 1 month.  Length of menses: 2-3 days    Her current contraception method is: oral contraceptives.  She  reports that she has never smoked. She has never used smokeless tobacco.    Patient is sexually active.  STD testing offered?  Declined  Last PHQ-9 score on record =   PHQ-9 SCORE 2018   PHQ-9 Total Score 0     Last GAD7 score on record = No flowsheet data found.      HEALTH MAINTENANCE:  Cholesterol: (  Cholesterol   Date Value Ref Range Status   2015 166 <200 mg/dL Final     Comment:     LDL Cholesterol is the primary guide to therapy.   The NCEP recommends further evaluation of: patients with cholesterol greater   than 200 mg/dL if additional risk factors are present, cholesterol greater   than   240 mg/dL, triglycerides greater than 150 mg/dL, or HDL less than 40 mg/dL.        Pap: (  Lab Results   Component Value Date    PAP NIL 2018    PAP NIL 2015    PAP NIL 2014        Health maintenance updated:  yes    HISTORY:  OB History    Para Term  AB Living   0 0 0 0 0 0   SAB IAB Ectopic Multiple Live Births   0 0 0 0 0       Patient Active Problem List   Diagnosis     Papanicolaou smear of cervix with low grade squamous intraepithelial lesion (LGSIL)     HSIL (high grade squamous intraepithelial lesion) on Pap smear     CARDIOVASCULAR SCREENING; LDL GOAL LESS THAN 160     Obesity     Oral contraceptive pill surveillance     Past Surgical History:   Procedure Laterality Date     HC COLP VAGINA W CERVIX IF PRES W BIOPSY  2007      Social History     Tobacco Use     Smoking status: Never Smoker     Smokeless tobacco: Never Used   Substance Use Topics      "Alcohol use: Yes     Comment: occ      Problem (# of Occurrences) Relation (Name,Age of Onset)    Cancer (1) Brother: bone cancer    Cerebrovascular Disease (1) Maternal Grandmother (Farheen)    Diabetes (1) Paternal Grandfather (Jamari)            Current Outpatient Medications   Medication Sig     levonorgestrel-ethinyl estradiol (AVIANE) 0.1-20 MG-MCG tablet Take 1 tablet by mouth daily     levonorgestrel-ethinyl estradiol (FALMINA) 0.1-20 MG-MCG tablet Take 1 tablet by mouth daily (Patient not taking: Reported on 6/20/2022)     No current facility-administered medications for this visit.     Allergies   Allergen Reactions     No Known Allergies        Past medical, surgical, social and family histories were reviewed and updated in EPIC.    ROS:   12 point review of systems negative other than symptoms noted below or in the HPI.  No urinary frequency or dysuria, bladder or kidney problems    EXAM:  /80 (BP Location: Right arm, Patient Position: Sitting, Cuff Size: Adult Large)   Pulse 78   Ht 1.64 m (5' 4.57\")   Wt 107.5 kg (237 lb 1.6 oz)   LMP 06/07/2022   BMI 39.99 kg/m     BMI: Body mass index is 39.99 kg/m .    PHYSICAL EXAM:  Constitutional:   Appearance: Well nourished, well developed, alert, in no acute distress  Neck:  Lymph Nodes:  No lymphadenopathy present    Thyroid:  Gland size normal, nontender, no nodules or masses present  on palpation  Chest:  Respiratory Effort:  Breathing unlabored  Cardiovascular:    Heart: Auscultation:  Regular rate, normal rhythm, no murmurs present  Breasts: Inspection of Breasts:  No lymphadenopathy present., Palpation of Breasts and Axillae:  No masses present on palpation, no breast tenderness., Axillary Lymph Nodes:  No lymphadenopathy present. and No nodularity, asymmetry or nipple discharge bilaterally.  Gastrointestinal:   Abdominal Examination:  Abdomen nontender to palpation, tone normal without rigidity or guarding, no masses present, umbilicus without " lesions   Liver and Spleen:  No hepatomegaly present, liver nontender to palpation    Hernias:  No hernias present  Lymphatic: Lymph Nodes:  No other lymphadenopathy present  Skin:  General Inspection:  No rashes present, no lesions present, no areas of  discoloration  Neurologic:    Mental Status:  Oriented X3.  Normal strength and tone, sensory exam                grossly normal, mentation intact and speech normal.    Psychiatric:   Mentation appears normal and affect normal/bright.         Pelvic Exam:  External Genitalia:     Normal appearance for age, no discharge present, no tenderness present, no inflammatory lesions present, color normal  Vagina:     Normal vaginal vault without central or paravaginal defects, no discharge present, no inflammatory lesions present, no masses present  Bladder:     Nontender to palpation  Urethra:   Urethral Body:  Urethra palpation normal, urethra structural support normal   Urethral Meatus:  No erythema or lesions present  Cervix:     Appearance healthy, no lesions present, nontender to palpation, no bleeding present  Uterus:     Uterus: firm, normal sized and nontender, midplane in position.   Adnexa:     No adnexal tenderness present, no adnexal masses present  Perineum:     Perineum within normal limits, no evidence of trauma, no rashes or skin lesions present  Anus:     Anus within normal limits, no hemorrhoids present  Inguinal Lymph Nodes:     No lymphadenopathy present  Pubic Hair:     Normal pubic hair distribution for age  Genitalia and Groin:     No rashes present, no lesions present, no areas of discoloration, no masses present      COUNSELING:   Reviewed preventive health counseling, as reflected in patient instructions    BMI: Body mass index is 39.99 kg/m .  Weight management plan: Discussed healthy diet and exercise guidelines    ASSESSMENT:  36 year old female with satisfactory annual exam.    ICD-10-CM    1. Screening for HIV (human immunodeficiency virus)   Z11.4    2. Need for hepatitis C screening test  Z11.59    3. Cervical cancer screening  Z12.4        PLAN:  Refill OCP  Pap (history of HGSIL)  Declined STD      Marlon Anguiano MD

## 2022-06-20 NOTE — PATIENT INSTRUCTIONS
If you have any questions regarding your visit, Please contact your care team.     [employer]: [phone]    To Schedule an Appointment 24/7  Call: [phone] HOU[facility] TELEPHONE [facility]BER     MD Mer Macedo CMA-Surgery Scheduler  [place]-Surgery Scheduler     Monday-[place]  1:00 pm-4:30 pm    Tuesday-[place]  7:30 am-4:30 pm    Wednesday-[place]  7:30 am-4:30 pm        Typical Surgery Days: Monday or Thursday       [address] [address]  [phone] appointment line  [phone] Fax    Imaging Scheduling all locations  [phone]    **Surgeries** Our Surgery Schedulers will contact you to schedule. If you do not receive a call within 3 business days, please call [phone].    If you need a medication refill, please contact your pharmacy. Please allow 3 business days for your refill to be completed.    As always, Thank you for trusting us with your healthcare needs!                   see additional instructions from your care team below

## 2022-06-22 LAB
BKR LAB AP GYN ADEQUACY: NORMAL
BKR LAB AP GYN INTERPRETATION: NORMAL
BKR LAB AP HPV REFLEX: NORMAL
BKR LAB AP PREVIOUS ABNL DX: NORMAL
BKR LAB AP PREVIOUS ABNORMAL: NORMAL
PATH REPORT.COMMENTS IMP SPEC: NORMAL
PATH REPORT.COMMENTS IMP SPEC: NORMAL
PATH REPORT.RELEVANT HX SPEC: NORMAL

## 2022-06-24 LAB
HUMAN PAPILLOMA VIRUS 16 DNA: NEGATIVE
HUMAN PAPILLOMA VIRUS 18 DNA: NEGATIVE
HUMAN PAPILLOMA VIRUS FINAL DIAGNOSIS: NORMAL
HUMAN PAPILLOMA VIRUS OTHER HR: NEGATIVE

## 2022-07-05 ENCOUNTER — MYC REFILL (OUTPATIENT)
Dept: OBGYN | Facility: CLINIC | Age: 37
End: 2022-07-05

## 2022-07-05 DIAGNOSIS — Z30.40 ENCOUNTER FOR SURVEILLANCE OF CONTRACEPTIVES, UNSPECIFIED CONTRACEPTIVE: ICD-10-CM

## 2022-07-05 RX ORDER — LEVONORGESTREL/ETHIN.ESTRADIOL 0.1-0.02MG
1 TABLET ORAL DAILY
Qty: 84 TABLET | Refills: 3 | Status: SHIPPED | OUTPATIENT
Start: 2022-07-05 | End: 2023-06-23

## 2022-07-05 NOTE — TELEPHONE ENCOUNTER
"Requested Prescriptions   Pending Prescriptions Disp Refills     levonorgestrel-ethinyl estradiol (AVIANE) 0.1-20 MG-MCG tablet 84 tablet 0     Sig: Take 1 tablet by mouth daily       Contraceptives Protocol Passed - 7/5/2022  1:58 PM        Passed - Patient is not a current smoker if age is 35 or older        Passed - Recent (12 mo) or future (30 days) visit within the authorizing provider's specialty     Patient has had an office visit with the authorizing provider or a provider within the authorizing providers department within the previous 12 mos or has a future within next 30 days. See \"Patient Info\" tab in inbasket, or \"Choose Columns\" in Meds & Orders section of the refill encounter.              Passed - Medication is active on med list        Passed - No active pregnancy on record        Passed - No positive pregnancy test in past 12 months             Pt last seen 6/20/2022 for annual exam    Last prescribed 1/31/2022 for 84 tablets with 0 refills    Rx approved, RN routing to provider to confirm OK to refill for 1 year or if only short course is advised.    Gwen Juarez RN on 7/5/2022 at 2:36 PM      "

## 2022-10-09 ENCOUNTER — HEALTH MAINTENANCE LETTER (OUTPATIENT)
Age: 37
End: 2022-10-09

## 2023-06-23 ENCOUNTER — OFFICE VISIT (OUTPATIENT)
Dept: FAMILY MEDICINE | Facility: CLINIC | Age: 38
End: 2023-06-23
Payer: COMMERCIAL

## 2023-06-23 VITALS
DIASTOLIC BLOOD PRESSURE: 74 MMHG | RESPIRATION RATE: 16 BRPM | OXYGEN SATURATION: 98 % | TEMPERATURE: 97.8 F | HEIGHT: 65 IN | WEIGHT: 227.4 LBS | SYSTOLIC BLOOD PRESSURE: 104 MMHG | HEART RATE: 60 BPM | BODY MASS INDEX: 37.89 KG/M2

## 2023-06-23 DIAGNOSIS — R79.89 ELEVATED TSH: ICD-10-CM

## 2023-06-23 DIAGNOSIS — Z00.00 ROUTINE GENERAL MEDICAL EXAMINATION AT A HEALTH CARE FACILITY: Primary | ICD-10-CM

## 2023-06-23 DIAGNOSIS — Z30.40 ENCOUNTER FOR SURVEILLANCE OF CONTRACEPTIVES, UNSPECIFIED CONTRACEPTIVE: ICD-10-CM

## 2023-06-23 DIAGNOSIS — R63.8 UNABLE TO LOSE WEIGHT: ICD-10-CM

## 2023-06-23 LAB
CHOLEST SERPL-MCNC: 191 MG/DL
FASTING STATUS PATIENT QL REPORTED: YES
GLUCOSE SERPL-MCNC: 110 MG/DL (ref 70–99)
HDLC SERPL-MCNC: 54 MG/DL
LDLC SERPL CALC-MCNC: 120 MG/DL
NONHDLC SERPL-MCNC: 137 MG/DL
T4 FREE SERPL-MCNC: 0.89 NG/DL (ref 0.9–1.7)
TRIGL SERPL-MCNC: 86 MG/DL
TSH SERPL DL<=0.005 MIU/L-ACNC: 7.92 UIU/ML (ref 0.3–4.2)

## 2023-06-23 PROCEDURE — 84439 ASSAY OF FREE THYROXINE: CPT | Performed by: NURSE PRACTITIONER

## 2023-06-23 PROCEDURE — 80061 LIPID PANEL: CPT | Performed by: NURSE PRACTITIONER

## 2023-06-23 PROCEDURE — 84443 ASSAY THYROID STIM HORMONE: CPT | Performed by: NURSE PRACTITIONER

## 2023-06-23 PROCEDURE — 99213 OFFICE O/P EST LOW 20 MIN: CPT | Mod: 25 | Performed by: NURSE PRACTITIONER

## 2023-06-23 PROCEDURE — 82947 ASSAY GLUCOSE BLOOD QUANT: CPT | Performed by: NURSE PRACTITIONER

## 2023-06-23 PROCEDURE — 36415 COLL VENOUS BLD VENIPUNCTURE: CPT | Performed by: NURSE PRACTITIONER

## 2023-06-23 PROCEDURE — 99385 PREV VISIT NEW AGE 18-39: CPT | Performed by: NURSE PRACTITIONER

## 2023-06-23 RX ORDER — LEVONORGESTREL/ETHIN.ESTRADIOL 0.1-0.02MG
1 TABLET ORAL DAILY
Qty: 84 TABLET | Refills: 3 | Status: SHIPPED | OUTPATIENT
Start: 2023-06-23

## 2023-06-23 ASSESSMENT — ENCOUNTER SYMPTOMS
ABDOMINAL PAIN: 0
NAUSEA: 0
JOINT SWELLING: 0
SHORTNESS OF BREATH: 0
ARTHRALGIAS: 0
DIZZINESS: 0
CONSTIPATION: 0
HEARTBURN: 0
COUGH: 0
CHILLS: 0
EYE PAIN: 0
DIARRHEA: 0
PARESTHESIAS: 0
HEMATOCHEZIA: 0
DYSURIA: 0
FEVER: 0
FREQUENCY: 0
MYALGIAS: 0
NERVOUS/ANXIOUS: 0
HEMATURIA: 0
WEAKNESS: 0
HEADACHES: 0
PALPITATIONS: 0
BREAST MASS: 0
SORE THROAT: 0

## 2023-06-23 ASSESSMENT — PAIN SCALES - GENERAL: PAINLEVEL: NO PAIN (0)

## 2023-06-23 NOTE — PROGRESS NOTES
SUBJECTIVE:   CC: Eufemia is an 37 year old who presents for preventive health visit.       2023     6:57 AM   Additional Questions   Roomed by Palmira WEIR   Accompanied by Self         2023     6:57 AM   Patient Reported Additional Medications   Patient reports taking the following new medications none     Healthy Habits:     Getting at least 3 servings of Calcium per day:  NO    Bi-annual eye exam:  Yes    Dental care twice a year:  Yes    Sleep apnea or symptoms of sleep apnea:  None    Diet:  Regular (no restrictions)    Frequency of exercise:  2-3 days/week    Duration of exercise:  45-60 minutes    Taking medications regularly:  Yes    Medication side effects:  None    PHQ-2 Total Score: 0    Additional concerns today:  No                  Today's PHQ-2 Score:       2023     5:35 AM   PHQ-2 (  [employer])   Q1: Little interest or pleasure in doing things 0   Q2: Feeling down, depressed or hopeless 0   PHQ-2 Score 0   Q1: Little interest or pleasure in doing things Not at all   Q2: Feeling down, depressed or hopeless Not at all   PHQ-2 Score 0       Have you ever done Advance Care Planning? (For example, a Health Directive, POLST, or a discussion with a medical provider or your loved ones about your wishes): No, advance care planning information given to patient to review.  Patient declined advance care planning discussion at this time.    Social History     Tobacco Use     Smoking status: Never     Smokeless tobacco: Never   Substance Use Topics     Alcohol use: Yes     Comment: Occasionally             2023     5:35 AM   Alcohol Use   Prescreen: >3 drinks/day or >7 drinks/week? No     Reviewed orders with patient.  Reviewed health maintenance and updated orders accordingly - Yes  Lab work is in process  Labs reviewed in EPIC    Breast Cancer Screenin/23/2023     5:36 AM   Breast CA Risk Assessment (FHS-7)   Do you have a family history of breast, colon, or ovarian cancer? No /  Unknown         Patient under 40 years of age: Routine Mammogram Screening not recommended.   Pertinent mammograms are reviewed under the imaging tab.    History of abnormal Pap smear: YES - updated in Problem List and Health Maintenance accordingly      Latest Ref Rng & Units 6/20/2022     5:01 PM 9/24/2018     3:52 PM 6/3/2015    11:48 AM   PAP / HPV   PAP  Negative for Intraepithelial Lesion or Malignancy (NILM)      PAP (Historical)   NIL     HPV 16 DNA Negative Negative  Negative  Negative    HPV 18 DNA Negative Negative  Negative  Negative    Other HR HPV Negative Negative  Negative  Negative      Reviewed and updated as needed this visit by clinical staff   Tobacco  Allergies  Meds              Reviewed and updated as needed this visit by Provider                 Past Medical History:   Diagnosis Date     ASCUS favoring benign 01/25/2011    + LR HPV     H/O colposcopy with cervical biopsy 02/12/2007    WNL with + HR HPV     HSIL (high grade squamous intraepithelial lesion) on Pap smear 04/09/2009     LSIL (low grade squamous intraepithelial lesion) on Pap smear 01/26/2007     Papanicolaou smear of cervix with low grade squamous intraepithelial lesion (LGSIL) 01/30/2007    Positive for HPV 66 - high risk. Colposcopy done.     Tendonitis of wrist, right 11/05/2013      Past Surgical History:   Procedure Laterality Date     HC COLP VAGINA W CERVIX IF PRES W BIOPSY  2/2007       Review of Systems   Constitutional: Negative for chills and fever.   HENT: Negative for congestion, ear pain, hearing loss and sore throat.    Eyes: Negative for pain and visual disturbance.   Respiratory: Negative for cough and shortness of breath.    Cardiovascular: Negative for chest pain, palpitations and peripheral edema.   Gastrointestinal: Negative for abdominal pain, constipation, diarrhea, heartburn, hematochezia and nausea.   Breasts:  Negative for tenderness, breast mass and discharge.   Genitourinary: Negative for dysuria,  "frequency, genital sores, hematuria, pelvic pain, urgency, vaginal bleeding and vaginal discharge.   Musculoskeletal: Negative for arthralgias, joint swelling and myalgias.   Skin: Negative for rash.   Neurological: Negative for dizziness, weakness, headaches and paresthesias.   Psychiatric/Behavioral: Negative for mood changes. The patient is not nervous/anxious.         OBJECTIVE:   /74 (BP Location: Right arm, Patient Position: Sitting, Cuff Size: Adult Large)   Pulse 60   Temp 97.8  F (36.6  C) (Temporal)   Resp 16   Ht 1.656 m (5' 5.2\")   Wt 103.1 kg (227 lb 6.4 oz)   LMP 06/01/2023 (Approximate)   SpO2 98%   BMI 37.61 kg/m    Physical Exam  GENERAL: healthy, alert and no distress  EYES: Eyes grossly normal to inspection, PERRL and conjunctivae and sclerae normal  HENT: ear canals and TM's normal, nose and mouth without ulcers or lesions  NECK: no adenopathy, no asymmetry, masses, or scars and thyroid normal to palpation  RESP: lungs clear to auscultation - no rales, rhonchi or wheezes  CV: regular rate and rhythm, normal S1 S2, no S3 or S4, no murmur, click or rub, no peripheral edema and peripheral pulses strong  ABDOMEN: soft, nontender, no hepatosplenomegaly, no masses and bowel sounds normal  MS: no gross musculoskeletal defects noted, no edema  SKIN: no suspicious lesions or rashes  NEURO: Normal strength and tone, mentation intact and speech normal  PSYCH: mentation appears normal, affect normal/bright    Diagnostic Test Results:  Labs reviewed in Epic  No results found for this or any previous visit (from the past 24 hour(s)).    ASSESSMENT/PLAN:   (Z00.00) Routine general medical examination at a health care facility  (primary encounter diagnosis)  Comment: recommend yearly physicals  Plan: Lipid panel reflex to direct LDL Fasting,         Glucose    (Z30.40) Encounter for surveillance of contraceptives, unspecified contraceptive  Comment: continue ocp.  She is considering IUD.    Plan: " "levonorgestrel-ethinyl estradiol (AVIANE)         0.1-20 MG-MCG tablet    (R63.8) Unable to lose weight  Comment: has been running- watching calories.  Advised lower carbs/ keep track calories/ carbs with job such as my fitness pal.  Will also check tsh  Plan: TSH with free T4 reflex      Patient has been advised of split billing requirements and indicates understanding: No      COUNSELING:  Reviewed preventive health counseling, as reflected in patient instructions      BMI:   Estimated body mass index is 37.61 kg/m  as calculated from the following:    Height as of this encounter: 1.656 m (5' 5.2\").    Weight as of this encounter: 103.1 kg (227 lb 6.4 oz).   Weight management plan: Discussed healthy diet and exercise guidelines        Running more regularly  Not drinking pop.    She reports that she has never smoked. She has never used smokeless tobacco.          Augusta Keys NP  [facility]  "

## 2024-05-24 ENCOUNTER — PATIENT OUTREACH (OUTPATIENT)
Dept: CARE COORDINATION | Facility: CLINIC | Age: 39
End: 2024-05-24
Payer: COMMERCIAL

## 2024-06-07 ENCOUNTER — PATIENT OUTREACH (OUTPATIENT)
Dept: CARE COORDINATION | Facility: CLINIC | Age: 39
End: 2024-06-07
Payer: COMMERCIAL

## 2024-08-03 ENCOUNTER — HEALTH MAINTENANCE LETTER (OUTPATIENT)
Age: 39
End: 2024-08-03

## 2025-01-27 SDOH — HEALTH STABILITY: PHYSICAL HEALTH: ON AVERAGE, HOW MANY MINUTES DO YOU ENGAGE IN EXERCISE AT THIS LEVEL?: 40 MIN

## 2025-01-27 SDOH — HEALTH STABILITY: PHYSICAL HEALTH: ON AVERAGE, HOW MANY DAYS PER WEEK DO YOU ENGAGE IN MODERATE TO STRENUOUS EXERCISE (LIKE A BRISK WALK)?: 3 DAYS

## 2025-01-27 ASSESSMENT — SOCIAL DETERMINANTS OF HEALTH (SDOH): HOW OFTEN DO YOU GET TOGETHER WITH FRIENDS OR RELATIVES?: ONCE A WEEK

## 2025-01-28 ENCOUNTER — OFFICE VISIT (OUTPATIENT)
Dept: FAMILY MEDICINE | Facility: CLINIC | Age: 40
End: 2025-01-28
Payer: COMMERCIAL

## 2025-01-28 VITALS
HEART RATE: 65 BPM | HEIGHT: 65 IN | SYSTOLIC BLOOD PRESSURE: 112 MMHG | RESPIRATION RATE: 18 BRPM | TEMPERATURE: 97 F | DIASTOLIC BLOOD PRESSURE: 82 MMHG | WEIGHT: 231 LBS | OXYGEN SATURATION: 98 % | BODY MASS INDEX: 38.49 KG/M2

## 2025-01-28 DIAGNOSIS — Z12.4 CERVICAL CANCER SCREENING: ICD-10-CM

## 2025-01-28 DIAGNOSIS — Z00.00 ROUTINE GENERAL MEDICAL EXAMINATION AT A HEALTH CARE FACILITY: Primary | ICD-10-CM

## 2025-01-28 DIAGNOSIS — E78.2 MIXED HYPERLIPIDEMIA: ICD-10-CM

## 2025-01-28 DIAGNOSIS — R73.09 ELEVATED GLUCOSE: ICD-10-CM

## 2025-01-28 DIAGNOSIS — R79.89 ELEVATED TSH: ICD-10-CM

## 2025-01-28 DIAGNOSIS — Z30.40 ENCOUNTER FOR SURVEILLANCE OF CONTRACEPTIVES, UNSPECIFIED CONTRACEPTIVE: ICD-10-CM

## 2025-01-28 DIAGNOSIS — E06.3 HYPOTHYROIDISM DUE TO HASHIMOTO THYROIDITIS: ICD-10-CM

## 2025-01-28 LAB
ALBUMIN SERPL BCG-MCNC: 4.4 G/DL (ref 3.5–5.2)
ALP SERPL-CCNC: 46 U/L (ref 40–150)
ALT SERPL W P-5'-P-CCNC: 15 U/L (ref 0–50)
ANION GAP SERPL CALCULATED.3IONS-SCNC: 10 MMOL/L (ref 7–15)
AST SERPL W P-5'-P-CCNC: 15 U/L (ref 0–45)
BILIRUB SERPL-MCNC: 0.4 MG/DL
BUN SERPL-MCNC: 14.2 MG/DL (ref 6–20)
CALCIUM SERPL-MCNC: 8.9 MG/DL (ref 8.8–10.4)
CHLORIDE SERPL-SCNC: 107 MMOL/L (ref 98–107)
CHOLEST SERPL-MCNC: 207 MG/DL
CREAT SERPL-MCNC: 0.72 MG/DL (ref 0.51–0.95)
EGFRCR SERPLBLD CKD-EPI 2021: >90 ML/MIN/1.73M2
EST. AVERAGE GLUCOSE BLD GHB EST-MCNC: 91 MG/DL
FASTING STATUS PATIENT QL REPORTED: YES
FASTING STATUS PATIENT QL REPORTED: YES
GLUCOSE SERPL-MCNC: 87 MG/DL (ref 70–99)
HBA1C MFR BLD: 4.8 %
HCO3 SERPL-SCNC: 22 MMOL/L (ref 22–29)
HDLC SERPL-MCNC: 64 MG/DL
LDLC SERPL CALC-MCNC: 130 MG/DL
NONHDLC SERPL-MCNC: 143 MG/DL
POTASSIUM SERPL-SCNC: 4.1 MMOL/L (ref 3.4–5.3)
PROT SERPL-MCNC: 7.4 G/DL (ref 6.4–8.3)
SODIUM SERPL-SCNC: 139 MMOL/L (ref 135–145)
T4 FREE SERPL-MCNC: 0.81 NG/DL (ref 0.9–1.7)
TRIGL SERPL-MCNC: 67 MG/DL
TSH SERPL DL<=0.005 MIU/L-ACNC: 5.46 UIU/ML (ref 0.3–4.2)

## 2025-01-28 PROCEDURE — 99395 PREV VISIT EST AGE 18-39: CPT | Performed by: STUDENT IN AN ORGANIZED HEALTH CARE EDUCATION/TRAINING PROGRAM

## 2025-01-28 PROCEDURE — 80061 LIPID PANEL: CPT | Performed by: STUDENT IN AN ORGANIZED HEALTH CARE EDUCATION/TRAINING PROGRAM

## 2025-01-28 PROCEDURE — G2211 COMPLEX E/M VISIT ADD ON: HCPCS | Performed by: STUDENT IN AN ORGANIZED HEALTH CARE EDUCATION/TRAINING PROGRAM

## 2025-01-28 PROCEDURE — 80053 COMPREHEN METABOLIC PANEL: CPT | Performed by: STUDENT IN AN ORGANIZED HEALTH CARE EDUCATION/TRAINING PROGRAM

## 2025-01-28 PROCEDURE — 84443 ASSAY THYROID STIM HORMONE: CPT | Performed by: STUDENT IN AN ORGANIZED HEALTH CARE EDUCATION/TRAINING PROGRAM

## 2025-01-28 PROCEDURE — 99214 OFFICE O/P EST MOD 30 MIN: CPT | Mod: 25 | Performed by: STUDENT IN AN ORGANIZED HEALTH CARE EDUCATION/TRAINING PROGRAM

## 2025-01-28 PROCEDURE — 83036 HEMOGLOBIN GLYCOSYLATED A1C: CPT | Performed by: STUDENT IN AN ORGANIZED HEALTH CARE EDUCATION/TRAINING PROGRAM

## 2025-01-28 PROCEDURE — 36415 COLL VENOUS BLD VENIPUNCTURE: CPT | Performed by: STUDENT IN AN ORGANIZED HEALTH CARE EDUCATION/TRAINING PROGRAM

## 2025-01-28 PROCEDURE — 84439 ASSAY OF FREE THYROXINE: CPT | Performed by: STUDENT IN AN ORGANIZED HEALTH CARE EDUCATION/TRAINING PROGRAM

## 2025-01-28 RX ORDER — LEVONORGESTREL/ETHIN.ESTRADIOL 0.1-0.02MG
1 TABLET ORAL DAILY
Qty: 84 TABLET | Refills: 3 | Status: SHIPPED | OUTPATIENT
Start: 2025-01-28

## 2025-01-28 RX ORDER — LEVOTHYROXINE SODIUM 50 UG/1
50 TABLET ORAL
Qty: 90 TABLET | Refills: 1 | Status: SHIPPED | OUTPATIENT
Start: 2025-01-28

## 2025-01-28 ASSESSMENT — PAIN SCALES - GENERAL: PAINLEVEL_OUTOF10: NO PAIN (0)

## 2025-01-28 NOTE — PATIENT INSTRUCTIONS
Patient Education   Preventive Care Advice   This is general advice given by our system to help you stay healthy. However, your care team may have specific advice just for you. Please talk to your care team about your preventive care needs.  Nutrition  Eat 5 or more servings of fruits and vegetables each day.  Try wheat bread, brown rice and whole grain pasta (instead of white bread, rice, and pasta).  Get enough calcium and vitamin D. Check the label on foods and aim for 100% of the RDA (recommended daily allowance).  Lifestyle  Exercise at least 150 minutes each week  (30 minutes a day, 5 days a week).  Do muscle strengthening activities 2 days a week. These help control your weight and prevent disease.  No smoking.  Wear sunscreen to prevent skin cancer.  Have a dental exam and cleaning every 6 months.  Yearly exams  See your health care team every year to talk about:  Any changes in your health.  Any medicines your care team has prescribed.  Preventive care, family planning, and ways to prevent chronic diseases.  Shots (vaccines)   HPV shots (up to age 26), if you've never had them before.  Hepatitis B shots (up to age 59), if you've never had them before.  COVID-19 shot: Get this shot when it's due.  Flu shot: Get a flu shot every year.  Tetanus shot: Get a tetanus shot every 10 years.  Pneumococcal, hepatitis A, and RSV shots: Ask your care team if you need these based on your risk.  Shingles shot (for age 50 and up)  General health tests  Diabetes screening:  Starting at age 35, Get screened for diabetes at least every 3 years.  If you are younger than age 35, ask your care team if you should be screened for diabetes.  Cholesterol test: At age 39, start having a cholesterol test every 5 years, or more often if advised.  Bone density scan (DEXA): At age 50, ask your care team if you should have this scan for osteoporosis (brittle bones).  Hepatitis C: Get tested at least once in your life.  STIs (sexually  transmitted infections)  Before age 24: Ask your care team if you should be screened for STIs.  After age 24: Get screened for STIs if you're at risk. You are at risk for STIs (including HIV) if:  You are sexually active with more than one person.  You don't use condoms every time.  You or a partner was diagnosed with a sexually transmitted infection.  If you are at risk for HIV, ask about PrEP medicine to prevent HIV.  Get tested for HIV at least once in your life, whether you are at risk for HIV or not.  Cancer screening tests  Cervical cancer screening: If you have a cervix, begin getting regular cervical cancer screening tests starting at age 21.  Breast cancer scan (mammogram): If you've ever had breasts, begin having regular mammograms starting at age 40. This is a scan to check for breast cancer.  Colon cancer screening: It is important to start screening for colon cancer at age 45.  Have a colonoscopy test every 10 years (or more often if you're at risk) Or, ask your provider about stool tests like a FIT test every year or Cologuard test every 3 years.  To learn more about your testing options, visit:   .  For help making a decision, visit:   https://bit.ly/wp02912.  Prostate cancer screening test: If you have a prostate, ask your care team if a prostate cancer screening test (PSA) at age 55 is right for you.  Lung cancer screening: If you are a current or former smoker ages 50 to 80, ask your care team if ongoing lung cancer screenings are right for you.  For informational purposes only. Not to replace the advice of your health care provider. Copyright   2023 [employer]. All rights reserved. Clinically reviewed by the [facility]. [employer] [id number] - REV 01/24.

## 2025-01-28 NOTE — PROGRESS NOTES
Preventive Care Visit  [facility]  Ishaan Mac MD, Family Medicine  Jan 28, 2025      Assessment & Plan   Problem List Items Addressed This Visit          Other    Elevated TSH    Relevant Orders    TSH with free T4 reflex (Completed)    T4 free (Completed)     Other Visit Diagnoses       Routine general medical examination at a health care facility    -  Primary    Encounter for surveillance of contraceptives, unspecified contraceptive        Relevant Medications    levonorgestrel-ethinyl estradiol (AVIANE) 0.1-20 MG-MCG tablet    Cervical cancer screening        Elevated glucose        Relevant Orders    Comprehensive metabolic panel (BMP + Alb, Alk Phos, ALT, AST, Total. Bili, TP) (Completed)    Hemoglobin A1c (Completed)    Mixed hyperlipidemia        Relevant Orders    Lipid panel reflex to direct LDL Fasting (Completed)    Hypothyroidism due to Hashimoto thyroiditis        Relevant Medications    levothyroxine (SYNTHROID/LEVOTHROID) 50 MCG tablet    Other Relevant Orders    **TSH with free T4 reflex FUTURE 2mo           Patient without contrast to oral combined contraceptives and will continue now.  History of elevated blood sugar and abnormal TSH.  Repeat labs today.  Importance of cardiovascular risk modification reviewed.  Patient found to have T4 that continues to be low and we will plan to treat with course of levothyroxine.  Causes and consequences of hypothyroidism discussed.  Her difficulty losing weight likely related.  Repeat TSH in 6 weeks and follow-up sooner if new or worsening issues arise.    The longitudinal plan of care for the diagnosis(es)/condition(s) as documented were addressed during this visit. Due to the added complexity in care, I will continue to support Eufemia in the subsequent management and with ongoing continuity of care.      Patient has been advised of split billing requirements and indicates understanding: Yes       BMI  Estimated body  "mass index is 38.03 kg/m  as calculated from the following:    Height as of this encounter: 1.66 m (5' 5.35\").    Weight as of this encounter: 104.8 kg (231 lb).   Weight management plan: Discussed healthy diet and exercise guidelines    Counseling  Appropriate preventive services were addressed with this patient via screening, questionnaire, or discussion as appropriate for fall prevention, nutrition, physical activity, Tobacco-use cessation, social engagement, weight loss and cognition.  Checklist reviewing preventive services available has been given to the patient.  Reviewed patient's diet, addressing concerns and/or questions.   She is at risk for lack of exercise and has been provided with information to increase physical activity for the benefit of her well-being.         Tiago Fall is a 39 year old, presenting for the following:  Physical        1/28/2025     9:19 AM   Additional Questions   Roomed by Jennifer GOMEZ        Health Care Directive  Patient does not have a Health Care Directive: Discussed advance care planning with patient; information given to patient to review.      1/27/2025   General Health   How would you rate your overall physical health? Good   Feel stress (tense, anxious, or unable to sleep) Only a little   (!) STRESS CONCERN      1/27/2025   Nutrition   Three or more servings of calcium each day? (!) NO   Diet: Regular (no restrictions)   How many servings of fruit and vegetables per day? (!) 0-1   How many sweetened beverages each day? 0-1         1/27/2025   Exercise   Days per week of moderate/strenous exercise 3 days   Average minutes spent exercising at this level 40 min         1/27/2025   Social Factors   Frequency of gathering with friends or relatives Once a week   Worry food won't last until get money to buy more No   Food not last or not have enough money for food? No   Do you have housing? (Housing is defined as stable permanent housing and does not include " staying ouside in a car, in a tent, in an abandoned building, in an overnight shelter, or couch-surfing.) Yes   Are you worried about losing your housing? No   Lack of transportation? No   Unable to get utilities (heat,electricity)? No         1/27/2025   Dental   Dentist two times every year? Yes         1/27/2025   TB Screening   Were you born outside of the [place]? No         Today's PHQ-2 Score:       1/27/2025    11:21 AM   PHQ-2 ( 1999 [employer])   Q1: Little interest or pleasure in doing things 0   Q2: Feeling down, depressed or hopeless 0   PHQ-2 Score 0    Q1: Little interest or pleasure in doing things Not at all   Q2: Feeling down, depressed or hopeless Not at all   PHQ-2 Score 0       Patient-reported           1/27/2025   Substance Use   Alcohol more than 3/day or more than 7/wk Not Applicable   Do you use any other substances recreationally? No     Social History     Tobacco Use    Smoking status: Never    Smokeless tobacco: Never   Vaping Use    Vaping status: Never Used   Substance Use Topics    Alcohol use: Yes     Comment: Occasionally    Drug use: No          Mammogram Screening - Patient under 40 years of age: Routine Mammogram Screening not recommended.           1/27/2025   One time HIV Screening   Previous HIV test? No         1/27/2025   STI Screening   New sexual partner(s) since last STI/HIV test? No     History of abnormal Pap smear: Hx of biopsy in the past. Has been normal for many years and on 3 year plan now. She would like to repeat with her GYN.         Latest Ref Rng & Units 6/20/2022     5:01 PM 9/24/2018     3:52 PM 6/3/2015    11:48 AM   PAP / HPV   PAP  Negative for Intraepithelial Lesion or Malignancy (NILM)      PAP (Historical)   NIL     HPV 16 DNA Negative Negative  Negative  Negative    HPV 18 DNA Negative Negative  Negative  Negative    Other HR HPV Negative Negative  Negative  Negative            1/27/2025   Contraception/Family Planning   Questions about contraception or family  "planning (!) YES         Reviewed and updated as needed this visit by Provider                    Past Medical History:   Diagnosis Date    ASCUS favoring benign 2011    + LR HPV    H/O colposcopy with cervical biopsy 2007    WNL with + HR HPV    HSIL (high grade squamous intraepithelial lesion) on Pap smear 2009    LSIL (low grade squamous intraepithelial lesion) on Pap smear 2007    Papanicolaou smear of cervix with low grade squamous intraepithelial lesion (LGSIL) 2007    Positive for HPV 66 - high risk. Colposcopy done.    Tendonitis of wrist, right 2013     Past Surgical History:   Procedure Laterality Date    HC COLP VAGINA W CERVIX IF PRES W BIOPSY  2007     OB History    Para Term  AB Living   0 0 0 0 0 0   SAB IAB Ectopic Multiple Live Births   0 0 0 0 0         Review of Systems  Constitutional, HEENT, cardiovascular, pulmonary, GI, , musculoskeletal, neuro, skin, endocrine and psych systems are negative, except as otherwise noted.     Objective    Exam  /82   Pulse 65   Temp 97  F (36.1  C) (Temporal)   Resp 18   Ht 1.66 m (5' 5.35\")   Wt 104.8 kg (231 lb)   LMP 2025 (Exact Date)   SpO2 98%   BMI 38.03 kg/m     Estimated body mass index is 38.03 kg/m  as calculated from the following:    Height as of this encounter: 1.66 m (5' 5.35\").    Weight as of this encounter: 104.8 kg (231 lb).    Physical Exam  GENERAL: alert and no distress  EYES: Eyes grossly normal to inspection, PERRL and conjunctivae and sclerae normal  HENT: ear canals and TM's normal, nose and mouth without ulcers or lesions  NECK: no adenopathy, no asymmetry, masses, or scars  RESP: lungs clear to auscultation - no rales, rhonchi or wheezes  CV: regular rate and rhythm, normal S1 S2, no S3 or S4, no murmur, click or rub, no peripheral edema  ABDOMEN: soft, nontender, no hepatosplenomegaly, no masses and bowel sounds normal  MS: no gross musculoskeletal defects noted, " no edema  SKIN: no suspicious lesions or rashes  NEURO: Normal strength and tone, mentation intact and speech normal  PSYCH: mentation appears normal, affect normal/bright        Signed Electronically by: Ishaan Mac MD

## 2025-03-12 ENCOUNTER — LAB (OUTPATIENT)
Dept: LAB | Facility: CLINIC | Age: 40
End: 2025-03-12
Payer: COMMERCIAL

## 2025-03-12 DIAGNOSIS — E06.3 HYPOTHYROIDISM DUE TO HASHIMOTO THYROIDITIS: ICD-10-CM

## 2025-03-12 LAB — TSH SERPL DL<=0.005 MIU/L-ACNC: 3.51 UIU/ML (ref 0.3–4.2)

## 2025-03-12 PROCEDURE — 36415 COLL VENOUS BLD VENIPUNCTURE: CPT

## 2025-03-12 PROCEDURE — 84443 ASSAY THYROID STIM HORMONE: CPT

## 2025-06-17 ENCOUNTER — OFFICE VISIT (OUTPATIENT)
Dept: FAMILY MEDICINE | Facility: CLINIC | Age: 40
End: 2025-06-17
Payer: COMMERCIAL

## 2025-06-17 VITALS
SYSTOLIC BLOOD PRESSURE: 114 MMHG | BODY MASS INDEX: 38.32 KG/M2 | RESPIRATION RATE: 14 BRPM | OXYGEN SATURATION: 100 % | DIASTOLIC BLOOD PRESSURE: 76 MMHG | WEIGHT: 230 LBS | HEART RATE: 62 BPM | HEIGHT: 65 IN | TEMPERATURE: 97.6 F

## 2025-06-17 DIAGNOSIS — Z12.4 CERVICAL CANCER SCREENING: Primary | ICD-10-CM

## 2025-06-17 DIAGNOSIS — E06.3 HYPOTHYROIDISM DUE TO HASHIMOTO THYROIDITIS: ICD-10-CM

## 2025-06-17 DIAGNOSIS — Z30.41 ORAL CONTRACEPTIVE PILL SURVEILLANCE: ICD-10-CM

## 2025-06-17 DIAGNOSIS — E78.2 MIXED HYPERLIPIDEMIA: ICD-10-CM

## 2025-06-17 LAB
HPV HR 12 DNA CVX QL NAA+PROBE: NEGATIVE
HPV16 DNA CVX QL NAA+PROBE: NEGATIVE
HPV18 DNA CVX QL NAA+PROBE: NEGATIVE
HUMAN PAPILLOMA VIRUS FINAL DIAGNOSIS: NORMAL

## 2025-06-17 PROCEDURE — 87624 HPV HI-RISK TYP POOLED RSLT: CPT | Performed by: STUDENT IN AN ORGANIZED HEALTH CARE EDUCATION/TRAINING PROGRAM

## 2025-06-17 PROCEDURE — 3078F DIAST BP <80 MM HG: CPT | Performed by: STUDENT IN AN ORGANIZED HEALTH CARE EDUCATION/TRAINING PROGRAM

## 2025-06-17 PROCEDURE — 1126F AMNT PAIN NOTED NONE PRSNT: CPT | Performed by: STUDENT IN AN ORGANIZED HEALTH CARE EDUCATION/TRAINING PROGRAM

## 2025-06-17 PROCEDURE — G2211 COMPLEX E/M VISIT ADD ON: HCPCS | Performed by: STUDENT IN AN ORGANIZED HEALTH CARE EDUCATION/TRAINING PROGRAM

## 2025-06-17 PROCEDURE — 99213 OFFICE O/P EST LOW 20 MIN: CPT | Performed by: STUDENT IN AN ORGANIZED HEALTH CARE EDUCATION/TRAINING PROGRAM

## 2025-06-17 PROCEDURE — 3074F SYST BP LT 130 MM HG: CPT | Performed by: STUDENT IN AN ORGANIZED HEALTH CARE EDUCATION/TRAINING PROGRAM

## 2025-06-17 ASSESSMENT — PAIN SCALES - GENERAL: PAINLEVEL_OUTOF10: NO PAIN (0)

## 2025-06-17 NOTE — PROGRESS NOTES
"  Assessment & Plan   Problem List Items Addressed This Visit          Other    Oral contraceptive pill surveillance     Other Visit Diagnoses         Cervical cancer screening    -  Primary    Relevant Orders    HPV and Gynecologic Cytology Panel - Recommended Age 30 - 65 Years      Mixed hyperlipidemia          Hypothyroidism due to Hashimoto thyroiditis               Update Pap today.  Last thyroid within normal ranges and tolerating birth control without issues.  She is feeling well and has no further concerns.    The longitudinal plan of care for the diagnosis(es)/condition(s) as documented were addressed during this visit. Due to the added complexity in care, I will continue to support Eufemia in the subsequent management and with ongoing continuity of care.           Subjective   Eufemia is a 39 year old, presenting for the following health issues:  Gyn Exam        6/17/2025     6:55 AM   Additional Questions   Roomed by Parvin DOMÍNGUEZ     History of Present Illness       Reason for visit:  As far as I know when I made the appointment it is for a pap smear.   She is taking medications regularly.        No concerns. Saw she was overdue for her pap smear. Hx of ASCUS ten years ago but has been normal since. No STD testing desired. No urinary symptoms. No discharge. Normal periods.       Review of Systems  Constitutional, HEENT, cardiovascular, pulmonary, GI, , musculoskeletal, neuro, skin, endocrine and psych systems are negative, except as otherwise noted.      Objective    /76   Pulse 62   Temp 97.6  F (36.4  C) (Temporal)   Resp 14   Ht 1.651 m (5' 5\")   Wt 104.3 kg (230 lb)   LMP 06/16/2025   SpO2 100%   BMI 38.27 kg/m    Body mass index is 38.27 kg/m .  Physical Exam   GENERAL: alert and no distress  EYES: Eyes grossly normal to inspection, PERRL and conjunctivae and sclerae normal  RESP: no cough or wheezes  CV: no peripheral edema  ABDOMEN: soft, nontender, no hepatosplenomegaly, no masses and bowel " sounds normal   (female): normal female external genitalia, normal urethral meatus, normal vaginal mucosa  MS: no gross musculoskeletal defects noted, no edema  SKIN: no suspicious lesions or rashes  NEURO: Normal strength and tone, mentation intact and speech normal  PSYCH: mentation appears normal, affect normal/bright    Parvin present for exam        Signed Electronically by: Ishaan Mac MD

## 2025-06-18 ENCOUNTER — RESULTS FOLLOW-UP (OUTPATIENT)
Dept: OBGYN | Facility: CLINIC | Age: 40
End: 2025-06-18

## 2025-06-18 DIAGNOSIS — R87.613 HSIL ON PAP SMEAR OF CERVIX: Primary | ICD-10-CM

## 2025-07-16 DIAGNOSIS — E06.3 HYPOTHYROIDISM DUE TO HASHIMOTO THYROIDITIS: ICD-10-CM

## 2025-07-16 RX ORDER — LEVOTHYROXINE SODIUM 50 UG/1
50 TABLET ORAL
Qty: 90 TABLET | Refills: 1 | Status: SHIPPED | OUTPATIENT
Start: 2025-07-16